# Patient Record
Sex: FEMALE | Race: WHITE | NOT HISPANIC OR LATINO | Employment: PART TIME | URBAN - METROPOLITAN AREA
[De-identification: names, ages, dates, MRNs, and addresses within clinical notes are randomized per-mention and may not be internally consistent; named-entity substitution may affect disease eponyms.]

---

## 2023-08-11 ENCOUNTER — CLINICAL SUPPORT (OUTPATIENT)
Dept: REHABILITATION | Facility: HOSPITAL | Age: 53
End: 2023-08-11
Payer: MEDICAID

## 2023-08-11 DIAGNOSIS — M25.612 DECREASED RANGE OF MOTION OF LEFT SHOULDER: ICD-10-CM

## 2023-08-11 PROCEDURE — 97110 THERAPEUTIC EXERCISES: CPT | Mod: PN | Performed by: PHYSICAL THERAPIST

## 2023-08-11 PROCEDURE — 97161 PT EVAL LOW COMPLEX 20 MIN: CPT | Mod: PN | Performed by: PHYSICAL THERAPIST

## 2023-08-11 NOTE — PLAN OF CARE
[facility]  Physical Therapy Initial Evaluation    Date: 8/11/2023   Name: Marizol Escobar  Clinic Number: [id number]    Therapy Diagnosis:   Encounter Diagnosis   Name Primary?    Decreased range of motion of left shoulder      Physician: Jones Larios MD    Physician Orders: PT Eval and Treat   Medical Diagnosis from Referral: M19.012 (ICD-10-CM) - Arthritis of left shoulder region  Evaluation Date: 8/11/2023  Authorization Period Expiration: 12/31/23  Plan of Care Expiration: 10/6/23  Progress Note Due: 10/4/23  Visit # / Visits authorized: 1/ 1   FOTO: 1/ 5   PTA Visit: 0/5    Precautions: Standard  Range of motion precautions: Shoulder PROM: 100° flexion, gentle ER to 30°, IR to chest and 45° Abduction   See attached protocol at bottom of note         Time In: 11:00 am  Time Out: 12:00 pm  Total Appointment Time (timed & untimed codes): 50 minutes (LCE, TE)      SUBJECTIVE   Date of onset: ~ 1 yr  Date of surgery: TSA on 8/9/23    History of current condition - Marizol reports: her shoulder's been bothering her for about 1 yr. She tried PT, but had no pain relief. She denies any trauma or fall leading to her shoulder pain. She had a left TSA on 8/9/23 by Dr. Larios and was referred to [facility] for PT.    Falls: none    Imaging, X-raysthree views of the left shoulder show avascular necrosis with humeral head collapse.    Prior Therapy: Yes, no relief  Social History: lives with son  Occupation: no  Hand dominance: Right  Prior Level of Function: 100% independent, but pain with overhead, reaching behind back, some trouble holding objects  Current Level of Function: in a sling, unable to use left upper extremity    Pain:  Current 7/10, worst 10/10, best 5/10   Location: left shoulder   Description: sharp  Aggravating Factors: movement  Easing Factors: medication, ice    Patients goals: cook, picking things up     Medical History:   Past Medical History:   Diagnosis Date    Enlarged  liver     Sickle cell anemia     Spleen enlarged        Surgical History:   Marizol Escobar  has a past surgical history that includes Total hip arthroplasty;  section, classic; Tubal ligation; and Cholecystectomy.    Medications:   Marizol CLAYTON has a current medication list which includes the following prescription(s): folic acid and oxycodone-acetaminophen.    Allergies:   Review of patient's allergies indicates:   Allergen Reactions    Morphine           OBJECTIVE       Posture: left arm in sling, sliding too far forward with hand hanging out      Range of Motion:    Left Right   Shoulder Flexion P: 90    A: NT P: 170     A:163   Shoulder abduction P: 45    A: NT P: 170     A: 170   Shoulder ER P: 10    A: NT P: WNL    A: 21 cm   Shoulder IR P: NT    A: NT P: WNL    A: 34 cm   Elbow WNL WNL   Wrist WNL WNL   Cervical Flexion [40 deg] WNL    Cervical Extension [50 deg]  WNL    Cervical Rotation [50 deg] WNL WNL   Cervical Side Flexion [22 deg] WNL WNL      Upper Extremity Strength      All tests taken in supine position  *=pain   Left  Right  Notes   Shoulder flexion NT 4/5    Shoulder abduction NT 4/5    Shoulder external rotation  NT 4+/5    Shoulder internal rotation  NT 5/5    Elbow flexion NT 5/5    Elbow extension NT 5/5        Special Tests:  None due to post op    Sensation: normal light touch sensation to BUE in C4-T2 dermatomal pattern      CMS Impairment/Limitation/Restriction for FOTO Shoulder Survey    Therapist reviewed FOTO scores for Marizol Escobar on 2023.   FOTO documents entered into [employer] - see Media section.    Current Limitation Score: 74%  Predicted Limitation Score: 40%           TREATMENT     Total Treatment time (time-based codes) separate from Evaluation: 15 minutes     **ALL BILLING AS TE PER MEDICAID GUIDELINES**     Marizol received therapeutic exercises to develop strength, endurance, ROM, flexibility, posture, and core stabilization for 7 minutes including:    Home exercise  "program:  Bicep curls: 20x  Pendulums: 30"  Wrist flexion/extension: 20x each      Marizol received the following manual therapy techniques: Joint mobilizations, Myofacial release, Soft tissue Mobilization, and Friction Massage were applied to the: left shoulder for 8 minutes, including:  Gentle left shoulder flexion, abduction and external rotation PROM within surgical precautions        PATIENT EDUCATION AND HOME EXERCISES     Education provided:   - anatomy  - importance of home exercise program compliance  - attendance policy  - surgical precautions and expectations  - proper sling position    Written Home Exercises Provided: yes. Exercises were reviewed and Marizol was able to demonstrate them prior to the end of the session.  Marizol demonstrated good  understanding of the education provided. See EMR under Patient Instructions for exercises provided during therapy sessions.    ASSESSMENT   Marizol CLAYTON is a 53 y.o. female referred to outpatient Physical Therapy with a medical diagnosis of shoulder osteoarthritis who had a left TSA on 8/9/23. She presents with high pain levels, decreased left shoulder range of motion and strength consistent with POD 2. Her sling was adjusted for better positioning today. Limited external rotation PROM compared to precautions due to pain. Flexion PROM near precaution. Lightheadedness reported at end of evaluation, but resolved quickly with water. She is right hand dominant. She is limited in all ADL and IADLs due to surgical precautions and range of motion/strength at this time. She is appropriate for skilled PT to help her reach her goals.    Pt prognosis is Good.   Pt will benefit from skilled outpatient Physical Therapy to address the deficits stated above and in the chart below, provide pt/family education, and to maximize pt's level of independence.     Plan of care discussed with patient: Yes  Pt's spiritual, cultural and educational needs considered and patient is agreeable to the " plan of care and goals as stated below:     Anticipated Barriers for therapy: sickle cell anemia    Medical Necessity is demonstrated by the following  History  Co-morbidities and personal factors that may impact the plan of care Co-morbidities:   Hx of DENYS, sickle cell anemia, hx of left knee surgery, hx of lumbar fusion    Personal Factors:   no deficits     high   Examination  Body Structures and Functions, activity limitations and participation restrictions that may impact the plan of care Body Regions:   upper extremities    Body Systems:    ROM  strength  motor control  motor learning    Participation Restrictions:   none    Activity limitations:   Learning and applying knowledge  no deficits    General Tasks and Commands  no deficits    Communication  no deficits    Mobility  lifting and carrying objects  fine hand use (grasping/picking up)  driving (bike, car, motorcycle)    Self care  washing oneself (bathing, drying, washing hands)  caring for body parts (brushing teeth, shaving, grooming)  toileting  dressing  eating  drinking  looking after one's health    Domestic Life  shopping  cooking  doing house work (cleaning house, washing dishes, laundry)  assisting others    Interactions/Relationships  no deficits    Life Areas  no deficits    Community and Social Life  community life  recreation and leisure         high     Clinical Presentation stable and uncomplicated low   Decision Making/ Complexity Score: low     Goals:  Short Term Goals: 6 weeks   1.  Patient will report < 5/10 shoulder pain at worst for improved ADL performance.  2.  Patient will demonstrate left shoulder flexion AROM > 100 deg to improve overhead reach.  3.  Patient will demonstrate left shoulder flexion PROM > 140 degree to improve overhead reach.  4.  Patient will demonstrate left shoulder external rotation PROM > 55 degree for improved reach and ADL performance.    Long Term Goals: 16 weeks   1. Patient will report ability to cook  without restrictions.  2. Patient will demonstrate ability to reach overhead and remove a 2 lb object 5 times to simulate removing objects from an overhead shelf w/o an increase in symptoms.  3. Patient will demonstrate all left upper extremity strength at least 75% of right per microFET testing.  4. Patient will improve FOTO to < 41% to improve ADL performance.    PLAN   Plan of care Certification: 8/11/2023 to 10/6/23.    Outpatient Physical Therapy 2 times weekly for 8 weeks to include the following interventions: Electrical Stimulation TENS, IFC, NMES, Manual Therapy, Moist Heat/ Ice, Neuromuscular Re-ed, Patient Education, Self Care, Therapeutic Activities, Therapeutic Exercise, and dry needling.             Ruslan Henderson, PT      I CERTIFY THE NEED FOR THESE SERVICES FURNISHED UNDER THIS PLAN OF TREATMENT AND WHILE UNDER MY CARE   Physician's comments:     Physician's Signature: ___________________________________________________

## 2023-08-11 NOTE — PROGRESS NOTES
[facility]  Physical Therapy Initial Evaluation    Date: 8/11/2023   Name: Marizol Escobar  Clinic Number: [id number]    Therapy Diagnosis:   Encounter Diagnosis   Name Primary?    Decreased range of motion of left shoulder      Physician: Jones Larios MD    Physician Orders: PT Eval and Treat   Medical Diagnosis from Referral: M19.012 (ICD-10-CM) - Arthritis of left shoulder region  Evaluation Date: 8/11/2023  Authorization Period Expiration: 12/31/23  Plan of Care Expiration: 10/6/23  Progress Note Due: 10/4/23  Visit # / Visits authorized: 1/ 1   FOTO: 1/ 5   PTA Visit: 0/5    Precautions: Standard  Range of motion precautions: Shoulder PROM: 100° flexion, gentle ER to 30°, IR to chest and 45° Abduction   See attached protocol at bottom of note         Time In: 11:00 am  Time Out: 12:00 pm  Total Appointment Time (timed & untimed codes): 50 minutes (LCE, TE)      SUBJECTIVE   Date of onset: ~ 1 yr  Date of surgery: TSA on 8/9/23    History of current condition - Marizol reports: her shoulder's been bothering her for about 1 yr. She tried PT, but had no pain relief. She denies any trauma or fall leading to her shoulder pain. She had a left TSA on 8/9/23 by Dr. Larios and was referred to [facility] for PT.    Falls: none    Imaging, X-raysthree views of the left shoulder show avascular necrosis with humeral head collapse.    Prior Therapy: Yes, no relief  Social History: lives with son  Occupation: no  Hand dominance: Right  Prior Level of Function: 100% independent, but pain with overhead, reaching behind back, some trouble holding objects  Current Level of Function: in a sling, unable to use left upper extremity    Pain:  Current 7/10, worst 10/10, best 5/10   Location: left shoulder   Description: sharp  Aggravating Factors: movement  Easing Factors: medication, ice    Patients goals: cook, picking things up     Medical History:   Past Medical History:   Diagnosis Date    Enlarged  liver     Sickle cell anemia     Spleen enlarged        Surgical History:   Marizol Escobar  has a past surgical history that includes Total hip arthroplasty;  section, classic; Tubal ligation; and Cholecystectomy.    Medications:   Marizol CLAYTON has a current medication list which includes the following prescription(s): folic acid and oxycodone-acetaminophen.    Allergies:   Review of patient's allergies indicates:   Allergen Reactions    Morphine           OBJECTIVE       Posture: left arm in sling, sliding too far forward with hand hanging out      Range of Motion:    Left Right   Shoulder Flexion P: 90    A: NT P: 170     A:163   Shoulder abduction P: 45    A: NT P: 170     A: 170   Shoulder ER P: 10    A: NT P: WNL    A: 21 cm   Shoulder IR P: NT    A: NT P: WNL    A: 34 cm   Elbow WNL WNL   Wrist WNL WNL   Cervical Flexion [40 deg] WNL    Cervical Extension [50 deg]  WNL    Cervical Rotation [50 deg] WNL WNL   Cervical Side Flexion [22 deg] WNL WNL      Upper Extremity Strength      All tests taken in supine position  *=pain   Left  Right  Notes   Shoulder flexion NT 4/5    Shoulder abduction NT 4/5    Shoulder external rotation  NT 4+/5    Shoulder internal rotation  NT 5/5    Elbow flexion NT 5/5    Elbow extension NT 5/5        Special Tests:  None due to post op    Sensation: normal light touch sensation to BUE in C4-T2 dermatomal pattern      CMS Impairment/Limitation/Restriction for FOTO Shoulder Survey    Therapist reviewed FOTO scores for Marizol Escobar on 2023.   FOTO documents entered into [employer] - see Media section.    Current Limitation Score: 74%  Predicted Limitation Score: 40%           TREATMENT     Total Treatment time (time-based codes) separate from Evaluation: 15 minutes     **ALL BILLING AS TE PER MEDICAID GUIDELINES**     Marizol received therapeutic exercises to develop strength, endurance, ROM, flexibility, posture, and core stabilization for 7 minutes including:    Home exercise  "program:  Bicep curls: 20x  Pendulums: 30"  Wrist flexion/extension: 20x each      Marizol received the following manual therapy techniques: Joint mobilizations, Myofacial release, Soft tissue Mobilization, and Friction Massage were applied to the: left shoulder for 8 minutes, including:  Gentle left shoulder flexion, abduction and external rotation PROM within surgical precautions        PATIENT EDUCATION AND HOME EXERCISES     Education provided:   - anatomy  - importance of home exercise program compliance  - attendance policy  - surgical precautions and expectations  - proper sling position    Written Home Exercises Provided: yes. Exercises were reviewed and Marizol was able to demonstrate them prior to the end of the session.  Marizol demonstrated good  understanding of the education provided. See EMR under Patient Instructions for exercises provided during therapy sessions.    ASSESSMENT   Marizol CLAYTON is a 53 y.o. female referred to outpatient Physical Therapy with a medical diagnosis of shoulder osteoarthritis who had a left TSA on 8/9/23. She presents with high pain levels, decreased left shoulder range of motion and strength consistent with POD 2. Her sling was adjusted for better positioning today. Limited external rotation PROM compared to precautions due to pain. Flexion PROM near precaution. Lightheadedness reported at end of evaluation, but resolved quickly with water. She is right hand dominant. She is limited in all ADL and IADLs due to surgical precautions and range of motion/strength at this time. She is appropriate for skilled PT to help her reach her goals.    Pt prognosis is Good.   Pt will benefit from skilled outpatient Physical Therapy to address the deficits stated above and in the chart below, provide pt/family education, and to maximize pt's level of independence.     Plan of care discussed with patient: Yes  Pt's spiritual, cultural and educational needs considered and patient is agreeable to the " plan of care and goals as stated below:     Anticipated Barriers for therapy: sickle cell anemia    Medical Necessity is demonstrated by the following  History  Co-morbidities and personal factors that may impact the plan of care Co-morbidities:   Hx of DENYS, sickle cell anemia, hx of left knee surgery, hx of lumbar fusion    Personal Factors:   no deficits     high   Examination  Body Structures and Functions, activity limitations and participation restrictions that may impact the plan of care Body Regions:   upper extremities    Body Systems:    ROM  strength  motor control  motor learning    Participation Restrictions:   none    Activity limitations:   Learning and applying knowledge  no deficits    General Tasks and Commands  no deficits    Communication  no deficits    Mobility  lifting and carrying objects  fine hand use (grasping/picking up)  driving (bike, car, motorcycle)    Self care  washing oneself (bathing, drying, washing hands)  caring for body parts (brushing teeth, shaving, grooming)  toileting  dressing  eating  drinking  looking after one's health    Domestic Life  shopping  cooking  doing house work (cleaning house, washing dishes, laundry)  assisting others    Interactions/Relationships  no deficits    Life Areas  no deficits    Community and Social Life  community life  recreation and leisure         high     Clinical Presentation stable and uncomplicated low   Decision Making/ Complexity Score: low     Goals:  Short Term Goals: 6 weeks   1.  Patient will report < 5/10 shoulder pain at worst for improved ADL performance.  2.  Patient will demonstrate left shoulder flexion AROM > 100 deg to improve overhead reach.  3.  Patient will demonstrate left shoulder flexion PROM > 140 degree to improve overhead reach.  4.  Patient will demonstrate left shoulder external rotation PROM > 55 degree for improved reach and ADL performance.    Long Term Goals: 16 weeks   1. Patient will report ability to cook  without restrictions.  2. Patient will demonstrate ability to reach overhead and remove a 2 lb object 5 times to simulate removing objects from an overhead shelf w/o an increase in symptoms.  3. Patient will demonstrate all left upper extremity strength at least 75% of right per microFET testing.  4. Patient will improve FOTO to < 41% to improve ADL performance.    PLAN   Plan of care Certification: 8/11/2023 to 10/6/23.    Outpatient Physical Therapy 2 times weekly for 8 weeks to include the following interventions: Electrical Stimulation TENS, IFC, NMES, Manual Therapy, Moist Heat/ Ice, Neuromuscular Re-ed, Patient Education, Self Care, Therapeutic Activities, Therapeutic Exercise, and dry needling .             Ruslan Henderson, PT      I CERTIFY THE NEED FOR THESE SERVICES FURNISHED UNDER THIS PLAN OF TREATMENT AND WHILE UNDER MY CARE   Physician's comments:     Physician's Signature: ___________________________________________________

## 2023-08-22 ENCOUNTER — DOCUMENTATION ONLY (OUTPATIENT)
Dept: REHABILITATION | Facility: HOSPITAL | Age: 53
End: 2023-08-22
Payer: MEDICAID

## 2023-08-22 NOTE — PROGRESS NOTES
PT met face to face with Gio Kapadia PTA to discuss pt's treatment plan and progress towards established goals. Pt will be seen by a physical therapist minimally every 6th visit or every 30 days.    Gio Kapadia PTA

## 2023-08-25 ENCOUNTER — DOCUMENTATION ONLY (OUTPATIENT)
Dept: REHABILITATION | Facility: HOSPITAL | Age: 53
End: 2023-08-25

## 2023-08-25 PROBLEM — M25.612 DECREASED RANGE OF MOTION OF LEFT SHOULDER: Status: RESOLVED | Noted: 2023-08-11 | Resolved: 2023-08-25

## 2023-08-25 NOTE — PROGRESS NOTES
"PATIENT DISCHARGE:  Called regarding no showed appointments yesterday and today. Patient states she's going to [facility] now "because they just kept calling me." She will be discharged from PT at [facility] at this time.    "